# Patient Record
Sex: FEMALE | Race: WHITE
[De-identification: names, ages, dates, MRNs, and addresses within clinical notes are randomized per-mention and may not be internally consistent; named-entity substitution may affect disease eponyms.]

---

## 2023-04-27 ENCOUNTER — HOSPITAL ENCOUNTER (OUTPATIENT)
Dept: HOSPITAL 46 - DS | Age: 68
Discharge: HOME | End: 2023-04-27
Attending: SURGERY
Payer: MEDICARE

## 2023-04-27 VITALS — SYSTOLIC BLOOD PRESSURE: 125 MMHG | DIASTOLIC BLOOD PRESSURE: 75 MMHG

## 2023-04-27 VITALS — SYSTOLIC BLOOD PRESSURE: 110 MMHG | DIASTOLIC BLOOD PRESSURE: 70 MMHG

## 2023-04-27 VITALS — HEIGHT: 65 IN | BODY MASS INDEX: 31.9 KG/M2 | WEIGHT: 191.49 LBS

## 2023-04-27 DIAGNOSIS — Z88.8: ICD-10-CM

## 2023-04-27 DIAGNOSIS — Z79.899: ICD-10-CM

## 2023-04-27 DIAGNOSIS — Z88.5: ICD-10-CM

## 2023-04-27 DIAGNOSIS — E11.42: ICD-10-CM

## 2023-04-27 DIAGNOSIS — Z88.2: ICD-10-CM

## 2023-04-27 DIAGNOSIS — Z79.84: ICD-10-CM

## 2023-04-27 DIAGNOSIS — K57.30: ICD-10-CM

## 2023-04-27 DIAGNOSIS — D12.8: ICD-10-CM

## 2023-04-27 DIAGNOSIS — I10: ICD-10-CM

## 2023-04-27 DIAGNOSIS — E78.5: ICD-10-CM

## 2023-04-27 DIAGNOSIS — Z12.11: Primary | ICD-10-CM

## 2023-04-27 PROCEDURE — G0500 MOD SEDAT ENDO SERVICE >5YRS: HCPCS

## 2023-04-27 NOTE — OR
[facility]
                                    [address], [place]  [address]
_________________________________________________________________________________________
                                                                 Signed   
 
 
DATE OF OPERATION:
04/27/2023
 
SURGEON:
Blane Brewster MD
 
PREOPERATIVE DIAGNOSIS:
Positive Cologuard test.
 
POSTOPERATIVE DIAGNOSES:
1. 6 mm polyp at 10 cm in rectum.
2. Minimal sigmoid diverticulosis.
 
PROCEDURE:
Colonoscopy with snare polypectomy.
 
ESTIMATED BLOOD LOSS:
None.
 
INDICATIONS:
Liz is a 68-year-old diabetic female, asked to see me for a followup colonoscopy.  I
actually helped her in 2011 for an unremarkable colonoscopy.  We asked her to follow up
in 10 years.  She is adopted.  She has since found some of her family members.  To her
knowledge, there is no family history of colon cancer or polyps.  She developed a
bleeding ulcer on Xarelto after her knee replacement.  She ended up at our [facility].  The upper endoscopy revealed her bleeding ulcer.  They performed an
incomplete colonoscopy, most likely from the blood in her colon.  More recently, she had
a positive Cologuard test.  However, she sees no blood in her stool.  To her knowledge,
no hemorrhoids.  She was asked to see me as a local general surgeon by her primary care
provider.  I had met with Liz in the office.  We had reviewed the above findings.  I
gave her a pamphlet on colonoscopy.  She understands the nature of the test.  There is
risk including, but not limited to gas bloating, crampy abdominal pain, bleeding,
perforation requiring surgery, and missed diagnosis.  We also reviewed the written
instructions for her bowel prep line by line.  She also understands the need for IV
conscious sedation.  She had expressed understanding and wished to proceed. 
 
PROCEDURE NOTE:
Liz was taken into our endoscopy suite and placed in the left lateral decubitus
position.  She told us she gets nauseated after anesthesia.  Consequently, we gave her 8
mg of Zofran IV and 5 mg of Compazine IV before we started.  In total, she only needed 3
mg of Versed and 100 mcg of fentanyl to cover the case.  A digital rectal exam was
performed and this was unremarkable.  She has good sphincter tone.  Really not much in
 
    Electronically Signed By: BLANE BREWSTER MD  04/27/23 1026
_________________________________________________________________________________________
PATIENT NAME:     LIZ GUTIERREZ               
MEDICAL RECORD #: [id number]            OPERATIVE REPORT              
          ACCT #: [id number]  
DATE OF BIRTH:   [birth year]            REPORT #: [id number]      
PHYSICIAN:        BLANE BREWSTER MD             
PCP:              YESENIA UMANA  [facility]      
REPORT IS CONFIDENTIAL AND NOT TO BE RELEASED WITHOUT AUTHORIZATION
 
 
                                  [facility]
                                    [address], [place]  [address]
_________________________________________________________________________________________
                                                                 Signed   
 
 
the way of any external hemorrhoids.  No masses.  The adult colonoscope was introduced
and advanced all around into the cecum under direct visualization of the camera without
difficulty.  Her prep was quite excellent.  We could easily see the appendiceal orifice
and the ileocecal valve.  The scope was then slowly withdrawn.  She has just a few
diverticula in the left and sigmoid colon.  They were small in size, few in number and
scattered about.  In the rectum at 10 cm behind the fold, she had a polyp about 6, maybe
10 mm at most in diameter.  It was divided at the base with the help of the snare.  It
was suctioned through the scope and caught in the canister for pathologic review.  There
was good hemostasis at that polypectomy site.  Upon retroflexion of the scope, there is
no additional pathology noted above the anal canal.  The gas was then suctioned out and
the colonoscope removed.  Liz tolerated the procedure quite well. 
 
RECOMMENDATIONS:
I will see Liz back in my office in 7 to 14 days to review her results.
 
 
 
            ________________________________________
            Blane Brewster MD 
 
 
ALB/MODL
Job #:  [id number]
DD:  04/27/2023 08:14:45
DT:  04/27/2023 09:26:36
 
cc:            MD Yesenia Domingo PA
 
 
Copies:  BLANE BREWSTER MD
~
 
 
 
 
 
 
 
 
 
 
    Electronically Signed By: BLANE BREWSTER MD  04/27/23 1026
_________________________________________________________________________________________
PATIENT NAME:     LIZ GUTIERREZ               
MEDICAL RECORD #: [id number]            OPERATIVE REPORT              
          ACCT #: [id number]  
DATE OF BIRTH:   [birth year]            REPORT #: [id number]      
PHYSICIAN:        BLANE BREWSTER MD             
PCP:              YESENIA UMANA      
REPORT IS CONFIDENTIAL AND NOT TO BE RELEASED WITHOUT AUTHORIZATION

## 2023-04-27 NOTE — NUR
04/27/23 0811 Aleksandra Pathak
0806-PATIENT ARRIVED TO PACU ON 3L NC RR EVEN. PLACED ON 2L NC.
PATIENT DROWSY OPENING EYES DENIES PAIN OR NAUSEA. ABDOMEN SOFT LAYING
LEFT LATERAL. PATIENT ENCOURAGED TO REST IVF INFUSING.

## 2023-05-03 NOTE — PATH
[facility]
                                    [address], [place]  [address]
_________________________________________________________________________________________
                                                                 Signed   
 
 
 
SPECIMEN(S): A RECTAL POLYP AT 10 CM
 
SPECIMEN SOURCE:
A. RECTAL POLYP AT 10 CM
 
CLINICAL HISTORY:
Personal history of positive Cologuard test. Postop:  Rectal polyp, minimal 
diverticulosis 
 
FINAL PATHOLOGIC DIAGNOSIS:
Rectum, 10 cm, polypectomy:
-  Multiple fragments of tubular adenoma.
-  There is no evidence of high-grade dysplasia or malignancy.
TWK:[facility]:C2NR
 
MICROSCOPIC EXAMINATION:
Histologic sections of all submitted blocks are examined by light microscopy.  
These findings, together with the gross examination, support the pathologic 
diagnosis. 
 
GROSS DESCRIPTION:
The specimen, labeled and designated "Wolf, rectal polyp at 10 cm," is 
received in formalin and consists of three tan soft tissue fragments measuring 
up to 0.2 cm. Entirely submitted in (A1). 
JS (under the direct supervision of a pathologist)
The Gross Description was prepared using a voice recognition system. The report 
was reviewed for accuracy; however, sound-alike word errors, addition and/or 
deletions may occur. If there is any 
question about this report, please contact Client Services.
 
PERFORMING LABORATORY:
The technical component was performed by [email or web address], [address] [place] [id number] (CLIA# [id number]).   The professional interpretation was 
performed by [facility], [facility], [address] [place] [address] (CLIA#:  
[id number]). 
 
Diagnostician:  Rob Park MD
Pathologist
Electronically Signed 05/03/2023
 
 
                                                                                    
_________________________________________________________________________________________
PATIENT NAME:     MELE WOLF               
MEDICAL RECORD #: [id number]            PATHOLOGY                     
          ACCT #: [id number]       ACCESSION #: [id number]     
DATE OF BIRTH:   [birth year]            REPORT #: [id number]       
PHYSICIAN:        [facility]              
PCP:              HAZEL UMANA  [facility]      
REPORT IS CONFIDENTIAL AND NOT TO BE RELEASED WITHOUT AUTHORIZATION
 
 
                                  [address] [place], [place]  [address]
_________________________________________________________________________________________
                                                                 Signed   
 
 
Copies:                                
~
 
 
 
 
 
 
 
 
 
 
 
 
 
 
 
 
 
 
 
 
 
 
 
 
 
 
 
 
 
 
 
 
 
 
 
 
 
 
 
 
 
                                                                                    
_________________________________________________________________________________________
PATIENT NAME:     MELE WOLF               
MEDICAL RECORD #: [id number]            PATHOLOGY                     
          ACCT #: [id number]       ACCESSION #: [id number]     
DATE OF BIRTH:   [birth year]            REPORT #: [id number]       
PHYSICIAN:        [facility]              
PCP:              HAZEL UMANA  [facility]      
REPORT IS CONFIDENTIAL AND NOT TO BE RELEASED WITHOUT AUTHORIZATION